# Patient Record
(demographics unavailable — no encounter records)

---
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
## 2025-07-24 NOTE — ADDENDUM
[FreeTextEntry1] : POCT glucose testing and Hemoglobin A1c was carried out today given diabetes diagnosis.  This note was written by Claudia Huynh on 07/24/2025 acting as medical scribe for Dr. Peter Wilcox. I, Dr. Peter Wilcox, have read and attest that all the information, medical decision making and discharge instructions within are true and accurate.